# Patient Record
Sex: FEMALE | Race: WHITE | NOT HISPANIC OR LATINO | URBAN - METROPOLITAN AREA
[De-identification: names, ages, dates, MRNs, and addresses within clinical notes are randomized per-mention and may not be internally consistent; named-entity substitution may affect disease eponyms.]

---

## 2018-09-16 ENCOUNTER — INPATIENT HOSPITAL (OUTPATIENT)
Dept: URBAN - METROPOLITAN AREA HOSPITAL 32 | Facility: HOSPITAL | Age: 58
End: 2018-09-16

## 2018-09-16 DIAGNOSIS — K85.20 ALCOHOL INDUCED ACUTE PANCREATITIS WITHOUT NECROSIS OR INFEC: ICD-10-CM

## 2018-09-16 DIAGNOSIS — F10.188 ALCOHOL ABUSE WITH OTHER ALCOHOL-INDUCED DISORDER: ICD-10-CM

## 2018-09-16 DIAGNOSIS — K86.1 OTHER CHRONIC PANCREATITIS: ICD-10-CM

## 2018-09-16 PROCEDURE — 99222 1ST HOSP IP/OBS MODERATE 55: CPT

## 2018-09-17 ENCOUNTER — INPATIENT HOSPITAL (OUTPATIENT)
Dept: URBAN - METROPOLITAN AREA HOSPITAL 32 | Facility: HOSPITAL | Age: 58
End: 2018-09-17

## 2018-09-17 DIAGNOSIS — K85.20 ALCOHOL INDUCED ACUTE PANCREATITIS WITHOUT NECROSIS OR INFEC: ICD-10-CM

## 2018-09-17 DIAGNOSIS — F10.188 ALCOHOL ABUSE WITH OTHER ALCOHOL-INDUCED DISORDER: ICD-10-CM

## 2018-09-17 DIAGNOSIS — K86.1 OTHER CHRONIC PANCREATITIS: ICD-10-CM

## 2018-09-17 PROCEDURE — 99232 SBSQ HOSP IP/OBS MODERATE 35: CPT

## 2018-09-18 PROCEDURE — 99232 SBSQ HOSP IP/OBS MODERATE 35: CPT

## 2018-09-19 PROCEDURE — 99232 SBSQ HOSP IP/OBS MODERATE 35: CPT

## 2021-01-06 ENCOUNTER — INPATIENT HOSPITAL (OUTPATIENT)
Dept: URBAN - METROPOLITAN AREA HOSPITAL 34 | Facility: HOSPITAL | Age: 61
End: 2021-01-06

## 2021-01-06 DIAGNOSIS — K85.10 BILIARY ACUTE PANCREATITIS WITHOUT NECROSIS OR INFECTION: ICD-10-CM

## 2021-01-06 DIAGNOSIS — R19.7 DIARRHEA, UNSPECIFIED: ICD-10-CM

## 2021-01-06 DIAGNOSIS — K80.51 CALCULUS OF BILE DUCT WITHOUT CHOLANGITIS OR CHOLECYSTITIS W: ICD-10-CM

## 2021-01-06 DIAGNOSIS — R10.13 EPIGASTRIC PAIN: ICD-10-CM

## 2021-01-06 DIAGNOSIS — R50.9 FEVER, UNSPECIFIED: ICD-10-CM

## 2021-01-06 DIAGNOSIS — R93.5 ABNORMAL FINDINGS ON DIAGNOSTIC IMAGING OF OTHER ABDOMINAL R: ICD-10-CM

## 2021-01-06 PROCEDURE — 99222 1ST HOSP IP/OBS MODERATE 55: CPT | Performed by: INTERNAL MEDICINE

## 2021-01-07 ENCOUNTER — INPATIENT HOSPITAL (OUTPATIENT)
Dept: URBAN - METROPOLITAN AREA HOSPITAL 34 | Facility: HOSPITAL | Age: 61
End: 2021-01-07

## 2021-01-07 DIAGNOSIS — K80.51 CALCULUS OF BILE DUCT WITHOUT CHOLANGITIS OR CHOLECYSTITIS W: ICD-10-CM

## 2021-01-07 DIAGNOSIS — R10.13 EPIGASTRIC PAIN: ICD-10-CM

## 2021-01-07 DIAGNOSIS — R50.9 FEVER, UNSPECIFIED: ICD-10-CM

## 2021-01-07 DIAGNOSIS — R93.5 ABNORMAL FINDINGS ON DIAGNOSTIC IMAGING OF OTHER ABDOMINAL R: ICD-10-CM

## 2021-01-07 DIAGNOSIS — K85.10 BILIARY ACUTE PANCREATITIS WITHOUT NECROSIS OR INFECTION: ICD-10-CM

## 2021-01-07 DIAGNOSIS — R19.7 DIARRHEA, UNSPECIFIED: ICD-10-CM

## 2021-01-07 PROCEDURE — 99232 SBSQ HOSP IP/OBS MODERATE 35: CPT | Performed by: NURSE PRACTITIONER

## 2021-01-08 PROCEDURE — 99232 SBSQ HOSP IP/OBS MODERATE 35: CPT | Performed by: NURSE PRACTITIONER

## 2021-03-02 ENCOUNTER — INPATIENT HOSPITAL (OUTPATIENT)
Dept: URBAN - METROPOLITAN AREA HOSPITAL 34 | Facility: HOSPITAL | Age: 61
End: 2021-03-02

## 2021-03-02 DIAGNOSIS — D12.3 BENIGN NEOPLASM OF TRANSVERSE COLON: ICD-10-CM

## 2021-03-02 DIAGNOSIS — K51.80 OTHER ULCERATIVE COLITIS WITHOUT COMPLICATIONS: ICD-10-CM

## 2021-03-02 DIAGNOSIS — R11.2 NAUSEA WITH VOMITING, UNSPECIFIED: ICD-10-CM

## 2021-03-02 DIAGNOSIS — D12.2 BENIGN NEOPLASM OF ASCENDING COLON: ICD-10-CM

## 2021-03-02 DIAGNOSIS — D12.5 BENIGN NEOPLASM OF SIGMOID COLON: ICD-10-CM

## 2021-03-02 DIAGNOSIS — R19.7 DIARRHEA, UNSPECIFIED: ICD-10-CM

## 2021-03-02 DIAGNOSIS — K29.60 OTHER GASTRITIS WITHOUT BLEEDING: ICD-10-CM

## 2021-03-02 DIAGNOSIS — D12.0 BENIGN NEOPLASM OF CECUM: ICD-10-CM

## 2021-03-02 DIAGNOSIS — E87.6 HYPOKALEMIA: ICD-10-CM

## 2021-03-02 PROCEDURE — 99222 1ST HOSP IP/OBS MODERATE 55: CPT | Performed by: INTERNAL MEDICINE

## 2021-03-03 PROCEDURE — 45380 COLONOSCOPY AND BIOPSY: CPT | Mod: 59 | Performed by: INTERNAL MEDICINE

## 2021-03-03 PROCEDURE — 43239 EGD BIOPSY SINGLE/MULTIPLE: CPT | Performed by: INTERNAL MEDICINE

## 2021-03-03 PROCEDURE — 45385 COLONOSCOPY W/LESION REMOVAL: CPT | Performed by: INTERNAL MEDICINE

## 2021-03-04 PROCEDURE — 99232 SBSQ HOSP IP/OBS MODERATE 35: CPT | Performed by: INTERNAL MEDICINE

## 2021-05-28 ENCOUNTER — OFFICE (OUTPATIENT)
Dept: URBAN - METROPOLITAN AREA CLINIC 102 | Facility: CLINIC | Age: 61
End: 2021-05-28

## 2021-05-28 VITALS
HEIGHT: 65 IN | SYSTOLIC BLOOD PRESSURE: 145 MMHG | TEMPERATURE: 98.4 F | DIASTOLIC BLOOD PRESSURE: 75 MMHG | HEART RATE: 102 BPM | WEIGHT: 167 LBS

## 2021-05-28 DIAGNOSIS — K21.00 GASTRO-ESOPHAGEAL REFLUX DISEASE WITH ESOPHAGITIS, WITHOUT B: ICD-10-CM

## 2021-05-28 DIAGNOSIS — K86.1 OTHER CHRONIC PANCREATITIS: ICD-10-CM

## 2021-05-28 DIAGNOSIS — R10.13 EPIGASTRIC PAIN: ICD-10-CM

## 2021-05-28 DIAGNOSIS — R93.2 ABNORMAL FINDINGS ON DIAGNOSTIC IMAGING OF LIVER AND BILIARY: ICD-10-CM

## 2021-05-28 DIAGNOSIS — K86.9 DISEASE OF PANCREAS, UNSPECIFIED: ICD-10-CM

## 2021-05-28 DIAGNOSIS — R93.5 ABNORMAL FINDINGS ON DIAGNOSTIC IMAGING OF OTHER ABDOMINAL R: ICD-10-CM

## 2021-05-28 PROCEDURE — 99215 OFFICE O/P EST HI 40 MIN: CPT

## 2021-05-28 RX ORDER — PANTOPRAZOLE SODIUM 40 MG/1
TABLET, DELAYED RELEASE ORAL
Qty: 30 | Refills: 5 | Status: ACTIVE
Start: 2021-05-28

## 2021-05-28 NOTE — SERVICEHPINOTES
HONEY UMAÑA    is a   59  female who presents for hospital follow up. She has been to the hospital multiple times throughout the past several months. She reports a h/o crohn's dz approx 15 years ago, not on medication. CT scan during one admission showed presumed enteritis which improved on antibiotics. Most recently in March she was admitted for abd pain, black stool, and diarrhea. EGD/colonoscopy 3/3/21 while inpt was overall unremarkable--chronic gastritis, neg h pylori, normal duodenum, adenomatous polyps, normal random colon biopsies and normal TI. Rpt colonoscopy in 3 years. Most recently, she was in ER on 5/25 for epigastric pain and n/v, states this happens intermittently and is severe. BRCTA chest/abdomen on 5/25--hepatic steatosis new subscapular areas of relative hyperdensity/enhancement which may be due to focal fatty sparking vs enhancing mass persistent biliary duct dilation which may be due to postsurgical changes, stable pancreatic ductal dilation with intraductal calcifications compatible w/ chronic pancreaitis no evidence of acute pancreatitis.. BR5/25 normal LFTs  lipase 68.BR(lipase in January admission was 481 o/w normal LFTs). During episodes of epigastric pain, nothing makes pain better or worse. Typically will have diarrhea during these episodes, this most recent time she did not.BRUnsure if pain is associated with vomiting.BRBMs more constipated, has not gone today or yesterday. No blood most of the time they are black. Has not taken anything for GERD recently. only pepcid, tums.BRNausea recently. She denies regular alcohol use. Stress smoker, states she smokes less than 1 pack per week. BR

## 2021-06-11 LAB
AMYLASE LEVEL: 72 U/L
CBC W/DIFF: BASO #: 0.1 K/MM3
CBC W/DIFF: BASO %: 0.9 %
CBC W/DIFF: EOS #: 0.2 K/MM3
CBC W/DIFF: EOS %: 2.4 %
CBC W/DIFF: HEMATOCRIT: 34 % — LOW
CBC W/DIFF: HEMOGLOBIN: 11.3 GM/DL — LOW
CBC W/DIFF: IMMATURE GRANULOCYTES #: 0.03 K/MM3 — HIGH
CBC W/DIFF: IMMATURE GRANULOCYTES %: 0.4 % — HIGH
CBC W/DIFF: LYMPH #: 1.9 K/MM3
CBC W/DIFF: LYMPH %: 26.1 %
CBC W/DIFF: MEAN CORPUSCULAR HEMOGLOBIN: 31.3 PG
CBC W/DIFF: MEAN CORPUSCULAR HGB CONC: 33.5 G/DL
CBC W/DIFF: MEAN CORPUSCULAR VOLUME: 93.4 FL
CBC W/DIFF: MEAN PLATELET VOLUME: 8.9 FL — LOW
CBC W/DIFF: MONO #: 0.6 K/MM3
CBC W/DIFF: MONO %: 7.5 %
CBC W/DIFF: NEUTROPHILS #: 4.6 K/MM3
CBC W/DIFF: NEUTROPHILS %: 62.7 %
CBC W/DIFF: NRBC ABSOLUTE COUNT: 0 K/MM3
CBC W/DIFF: NRBC%: 0 /100WBC
CBC W/DIFF: PLATELET COUNT: 334 K/MM3
CBC W/DIFF: RED BLOOD COUNT: 3.61 M/MM3 — LOW
CBC W/DIFF: RED CELL DISTRIBUTION WIDTH #: 47.8 FL — HIGH
CBC W/DIFF: RED CELL DISTRIBUTION WIDTH %: 14.1 %
CBC W/DIFF: WHITE BLOOD COUNT: 7.4 K/MM3
COMPREHENSIVE METABOLIC PROF: ALANINE AMINOTRANS (ALT/GPT): 24 IU/L
COMPREHENSIVE METABOLIC PROF: ALBUMIN: 3.5 GM/DL
COMPREHENSIVE METABOLIC PROF: ALK PHOS,TOTAL: 86 IU/L
COMPREHENSIVE METABOLIC PROF: ANION GAP: 7
COMPREHENSIVE METABOLIC PROF: AST/SGOT ASPARTATE AMINO TRANS: 24 U/L
COMPREHENSIVE METABOLIC PROF: BILIRUBIN,TOTAL: 0.5 MG/DL
COMPREHENSIVE METABOLIC PROF: BLOOD UREA NITROGEN: 16 MG/DL
COMPREHENSIVE METABOLIC PROF: CALCIUM LEVEL: 8.8 MG/DL
COMPREHENSIVE METABOLIC PROF: CARBON DIOXIDE: 29 MEQ/L
COMPREHENSIVE METABOLIC PROF: CHLORIDE LEVEL: 98 MEQ/L — LOW
COMPREHENSIVE METABOLIC PROF: CREATININE,SERUM: 1.37 MG/DL — HIGH
COMPREHENSIVE METABOLIC PROF: GLOMERULAR FILTRATION RATE: 39 ML/MIN
COMPREHENSIVE METABOLIC PROF: GLUCOSE,RANDOM: 111 MG/DL — HIGH
COMPREHENSIVE METABOLIC PROF: POTASSIUM LEVEL: 3.3 MEQ/L — LOW
COMPREHENSIVE METABOLIC PROF: SODIUM LEVEL, SERUM: 134 MEQ/L
COMPREHENSIVE METABOLIC PROF: TOTAL PROTEIN: 7.4 GM/DL
LIPASE: 192 U/L
Lab: (no result)
Lab: 128 MG/DL — HIGH
Lab: 251 MG/DL — HIGH
Lab: 286 MG/DL — HIGH
Lab: 3.8
Lab: 57 MG/DL — HIGH
Lab: 66 MG/DL

## 2021-06-12 LAB
IGG, SUBCLASS 4: 7 MG/DL
Lab: (no result)

## 2021-06-16 LAB
CALPROTECTIN, FECAL - REF: 58 UG/G
PANCREATIC ELASTASE STOOL -REF: 303
PANCREATIC ELASTASE STOOL -REF: PERFORMING LOCATION: (no result)

## 2021-08-26 ENCOUNTER — OFFICE (OUTPATIENT)
Dept: URBAN - METROPOLITAN AREA CLINIC 102 | Facility: CLINIC | Age: 61
End: 2021-08-26

## 2021-08-26 VITALS
HEIGHT: 65 IN | HEART RATE: 90 BPM | DIASTOLIC BLOOD PRESSURE: 106 MMHG | WEIGHT: 168 LBS | SYSTOLIC BLOOD PRESSURE: 192 MMHG | TEMPERATURE: 99.3 F

## 2021-08-26 DIAGNOSIS — R19.7 DIARRHEA, UNSPECIFIED: ICD-10-CM

## 2021-08-26 DIAGNOSIS — K86.1 OTHER CHRONIC PANCREATITIS: ICD-10-CM

## 2021-08-26 DIAGNOSIS — R11.2 NAUSEA WITH VOMITING, UNSPECIFIED: ICD-10-CM

## 2021-08-26 DIAGNOSIS — R93.5 ABNORMAL FINDINGS ON DIAGNOSTIC IMAGING OF OTHER ABDOMINAL R: ICD-10-CM

## 2021-08-26 DIAGNOSIS — R10.13 EPIGASTRIC PAIN: ICD-10-CM

## 2021-08-26 DIAGNOSIS — R93.2 ABNORMAL FINDINGS ON DIAGNOSTIC IMAGING OF LIVER AND BILIARY: ICD-10-CM

## 2021-08-26 PROCEDURE — 99215 OFFICE O/P EST HI 40 MIN: CPT

## 2021-08-26 NOTE — SERVICEHPINOTES
HONEY UMAÑA   is a   61  female who presents for follow up.BRPrior hx:Candelario has been to the hospital multiple times throughout the past several months. She reports a h/o crohn's dz approx 15 years ago, not on medication. CT scan during one admission showed presumed enteritis which improved on antibiotics. Most recently in March she was admitted for abd pain, black stool, and diarrhea. EGD/colonoscopy 3/3/21 while inpt was overall unremarkable--chronic gastritis, neg h pylori, normal duodenum, adenomatous polyps, normal random colon biopsies and normal TI. Rpt colonoscopy in 3 years. BRMost recently, she was in ER on 5/25 for epigastric pain and n/v, states this happens intermittently and is severe. CTA chest/abdomen on 5/25--hepatic steatosis new subscapular areas of relative hyperdensity/enhancement which may be due to focal fatty sparking vs enhancing mass persistent biliary duct dilation which may be due to postsurgical changes, stable pancreatic ductal dilation with intraductal calcifications compatible w/ chronic pancreaitis no evidence of acute pancreatitis.. BR5/25 normal LFTs lipase 68.BR(lipase in January admission was 481 o/w normal LFTs). BRAfter last visit, in June she had labs --hgb 11.3, normal LFTs, normal lipase/amylase, TG-286, normal IgG4. Fecal calprotectin 58, fecal elastase 303. BRMRI/MRCP showed no suspicious lesion in the liver, chronic biliary dilation, chronic pancreatic duct dilation w/ calcifications. Candelario was referred for and EUS but no-showed to appt--this was a TM appt and states she missed the phone call. She continues to have episodes of epigastric pain/cramping, n/v, and diarrhea. Episodes becoming more frequent. Vomiting is usually first sx then pain starts. Typically will have diarrhea during these episodes, this most recent time she did not. States she does not drink any alcohol but drinks "a lot" of nonfat milk. PCP gave her Lomotil, may help a little and has been taking this regularly. Dicyclomine does not help with pain.